# Patient Record
Sex: MALE | Race: WHITE | NOT HISPANIC OR LATINO | URBAN - NONMETROPOLITAN AREA
[De-identification: names, ages, dates, MRNs, and addresses within clinical notes are randomized per-mention and may not be internally consistent; named-entity substitution may affect disease eponyms.]

---

## 2024-01-18 ENCOUNTER — TELEPHONE (OUTPATIENT)
Dept: PRIMARY CARE | Facility: CLINIC | Age: 29
End: 2024-01-18

## 2024-01-18 NOTE — TELEPHONE ENCOUNTER
Had a sleep apnea test and was supposed to get a CPAP machine, but he couldn't get it because there was a shortage at the time. Patient now lives in [place] and his new doctor is asking for the results of the sleep study. Asking for a call back at [phone]

## 2024-04-17 ENCOUNTER — OFFICE VISIT (OUTPATIENT)
Dept: GASTROENTEROLOGY | Facility: CLINIC | Age: 29
End: 2024-04-17

## 2024-04-17 VITALS
HEIGHT: 73 IN | OXYGEN SATURATION: 98 % | WEIGHT: 256 LBS | BODY MASS INDEX: 33.93 KG/M2 | SYSTOLIC BLOOD PRESSURE: 134 MMHG | HEART RATE: 74 BPM | TEMPERATURE: 98.6 F | DIASTOLIC BLOOD PRESSURE: 78 MMHG

## 2024-04-17 DIAGNOSIS — R10.32 LEFT LOWER QUADRANT ABDOMINAL PAIN: ICD-10-CM

## 2024-04-17 DIAGNOSIS — K62.5 RECTAL BLEEDING: Primary | ICD-10-CM

## 2024-04-17 NOTE — PROGRESS NOTES
Chief Complaint   Patient presents with    Abdominal Pain     Pain left lower side pain had colon 10 years ago in [place]        PCP: Lebron Dsouza MD  REFER: Lebron Dsouza MD    Subjective     HPI    Alexandro Lewis was referred to office for lower abdominal pain.  Diagnosed with colitis, treated with antibiotic.  Lab work was unremarkable with exception of elevated liver enzymes.  Does not consume alcohol on reg basis.   Alexandro Lewis  has lost 4 lbs with dietary changes.    Abdominal pain improved after taking antibiotics.  Bowels move 1-2 times per day.  He continues to experience some rectal bleeding.  Colonoscopy age of 18 for evaluation of rectal bleeding.   No imaging.  Positive family hx of colon cancer in grandmother.  Does not have first degree relative with colon polyps.      Past Medical History:   Diagnosis Date    Depression        Past Surgical History:   Procedure Laterality Date    TONSILLECTOMY         Outpatient Medications Marked as Taking for the 4/17/24 encounter (Office Visit) with Davon Crenshaw DO   Medication Sig Dispense Refill    escitalopram (LEXAPRO) 10 MG tablet Take 1 tablet by mouth Daily.      multivitamin (THERAGRAN) tablet tablet Take  by mouth Daily.      Omega 3 1000 MG capsule Take  by mouth.         Allergies   Allergen Reactions    Sulfa Antibiotics Other (See Comments)     unsure       Social History     Socioeconomic History    Marital status: Unknown   Tobacco Use    Smoking status: Never    Smokeless tobacco: Never   Vaping Use    Vaping status: Never Used   Substance and Sexual Activity    Alcohol use: Yes     Comment: occ.    Drug use: Never       Review of Systems   Constitutional:  Negative for fever and unexpected weight change.   HENT:  Negative for trouble swallowing.    Respiratory:  Negative for shortness of breath.    Cardiovascular:  Negative for chest pain.   Gastrointestinal:  Positive for abdominal pain and anal bleeding.       Objective     Vitals:  "   04/17/24 1419   BP: 134/78   Pulse: 74   Temp: 98.6 °F (37 °C)   SpO2: 98%   Weight: 116 kg (256 lb)   Height: 185.4 cm (73\")     Body mass index is 33.78 kg/m².    Physical Exam  Constitutional:       Appearance: Normal appearance. He is well-developed.   Eyes:      General: No scleral icterus.  Cardiovascular:      Heart sounds: Normal heart sounds. No murmur heard.  Pulmonary:      Effort: Pulmonary effort is normal.   Abdominal:      General: Bowel sounds are normal. There is no distension.      Palpations: Abdomen is soft.      Tenderness: There is no abdominal tenderness. There is no guarding.   Skin:     General: Skin is warm and dry.      Coloration: Skin is not jaundiced.   Neurological:      Mental Status: He is alert.   Psychiatric:         Behavior: Behavior is cooperative.         Imaging Results (Most Recent)       None            Body mass index is 33.78 kg/m².    Assessment & Plan     Diagnoses and all orders for this visit:    1. Rectal bleeding (Primary)  -     Case Request; Standing  -     Implement Anesthesia Orders Day of Procedure; Standing  -     Obtain Informed Consent; Standing  -     Case Request    2. Left lower quadrant abdominal pain        COLONOSCOPY WITH ANESTHESIA (N/A)    Miralax prep    Further recommendation pending finding on colonoscopy     Advised pt to stop use of NSAIDs, Fish Oil, and MV 5 days prior to procedure, per Dr Crenshaw protocol.  Tylenol based products are ok to take.  Pt verbalized understanding.        Davon Crenshaw, DO  04/17/24          There are no Patient Instructions on file for this visit.    "

## 2024-05-06 ENCOUNTER — TELEPHONE (OUTPATIENT)
Dept: GASTROENTEROLOGY | Facility: CLINIC | Age: 29
End: 2024-05-06